# Patient Record
Sex: FEMALE | Race: WHITE
[De-identification: names, ages, dates, MRNs, and addresses within clinical notes are randomized per-mention and may not be internally consistent; named-entity substitution may affect disease eponyms.]

---

## 2018-05-22 ENCOUNTER — HOSPITAL ENCOUNTER (EMERGENCY)
Dept: HOSPITAL 97 - ER | Age: 33
Discharge: ANOTHER HOSPITAL | End: 2018-05-22
Payer: SELF-PAY

## 2018-05-22 DIAGNOSIS — R45.851: Primary | ICD-10-CM

## 2018-05-22 DIAGNOSIS — N39.0: ICD-10-CM

## 2018-05-22 LAB
ALBUMIN SERPL BCP-MCNC: 4.1 G/DL (ref 3.2–5.5)
ALCOHOL SERUM/PLASMA: < 10 MG/DL
ALP SERPL-CCNC: 46 IU/L (ref 42–121)
ALT SERPL W P-5'-P-CCNC: 12 IU/L (ref 10–60)
AST SERPL W P-5'-P-CCNC: 15 IU/L (ref 10–42)
BUN BLD-MCNC: 11 MG/DL (ref 6–20)
GLUCOSE SERPLBLD-MCNC: 97 MG/DL (ref 65–120)
HCT VFR BLD CALC: 39.2 % (ref 36–45)
INR BLD: 1.03
LYMPHOCYTES # SPEC AUTO: 2.3 K/UL (ref 0.7–4.9)
MCH RBC QN AUTO: 28.8 PG (ref 27–35)
MCV RBC: 88.9 FL (ref 80–100)
METHAMPHET UR QL SCN: POSITIVE
PMV BLD: 10.9 FL (ref 7.6–11.3)
POTASSIUM SERPL-SCNC: 4.1 MEQ/L (ref 3.6–5)
RBC # BLD: 4.41 M/UL (ref 3.86–4.86)
THC SERPL-MCNC: NEGATIVE NG/ML

## 2018-05-22 PROCEDURE — 80048 BASIC METABOLIC PNL TOTAL CA: CPT

## 2018-05-22 PROCEDURE — 81003 URINALYSIS AUTO W/O SCOPE: CPT

## 2018-05-22 PROCEDURE — 80307 DRUG TEST PRSMV CHEM ANLYZR: CPT

## 2018-05-22 PROCEDURE — 80320 DRUG SCREEN QUANTALCOHOLS: CPT

## 2018-05-22 PROCEDURE — 99285 EMERGENCY DEPT VISIT HI MDM: CPT

## 2018-05-22 PROCEDURE — 85730 THROMBOPLASTIN TIME PARTIAL: CPT

## 2018-05-22 PROCEDURE — 85025 COMPLETE CBC W/AUTO DIFF WBC: CPT

## 2018-05-22 PROCEDURE — 81025 URINE PREGNANCY TEST: CPT

## 2018-05-22 PROCEDURE — 80329 ANALGESICS NON-OPIOID 1 OR 2: CPT

## 2018-05-22 PROCEDURE — 80076 HEPATIC FUNCTION PANEL: CPT

## 2018-05-22 PROCEDURE — 36415 COLL VENOUS BLD VENIPUNCTURE: CPT

## 2018-05-22 PROCEDURE — 93005 ELECTROCARDIOGRAM TRACING: CPT

## 2018-05-22 PROCEDURE — 85610 PROTHROMBIN TIME: CPT

## 2018-05-22 NOTE — EDPHYS
Physician Documentation                                                                           

 [facility]                                                                

Name: Pilar Haro                                                                                  

Age: 33 yrs                                                                                       

Sex: Female                                                                                       

: 1985                                                                                   

MRN: [id number]                                                                                   

Arrival Date: 2018                                                                          

Time: 14:12                                                                                       

Account#: [id number]                                                                            

Bed 17                                                                                            

Private MD:                                                                                       

ED Physician Caleb Figueroa                                                                         

HPI:                                                                                              

                                                                                             

14:46 This 33 yrs old  Female presents to ER via EMS with complaints of Suicidal     kb  

      Ideation.                                                                                   

14:46 The patient presents to the emergency department with suicide ideation, but the patient kb  

      has no formulated plan. Onset: The symptoms/episode began/occurred a few months ago.        

      Associated signs and symptoms: Pertinent positives; suicide ideation, Pertinent             

      negatives: abdominal pain, anxiety, chest pain, chills, delusions, depression, fever,       

      hallucinations, headache, homicidal ideation, nausea, night sweats, palpitations,           

      paranoia, shortness of breath, substance abuse, tremor, vomiting. Severity of symptoms:     

      At their worst the symptoms were moderate in the emergency department the symptoms are      

      unchanged. The patient has not experienced similar symptoms in the past. The patient        

      has not recently seen a physician.                                                          

14:49 Pt states she has been having suicidal thoughts for the last few months. States her     kb  

      kids were taken by CPS, she became homeless and her car just repossessed. Does not have     

      a plan. Has never had these feelings before. .                                              

                                                                                                  

OB/GYN:                                                                                           

14:18 LMP 2018                                                                           em  

                                                                                                  

Historical:                                                                                       

- Allergies:                                                                                      

14:18 No Known Allergies;                                                                     em  

- Home Meds:                                                                                      

14:18 None [Active];                                                                          em  

- PMHx:                                                                                           

14:18 None;                                                                                   em  

- PSHx:                                                                                           

14:18 Tubal ligation;                                                                         em  

                                                                                                  

- Immunization history:: Adult Immunizations not up to date.                                      

- Social history:: Smoking status: Patient/guardian denies using tobacco.                         

- Ebola Screening: : No symptoms or risks identified at this time.                                

                                                                                                  

                                                                                                  

ROS:                                                                                              

14:45 Constitutional: Negative for fever, chills, and weight loss, ENT: Negative for injury,  kb  

      pain, and discharge, Neck: Negative for injury, pain, and swelling, Cardiovascular:         

      Negative for chest pain, palpitations, and edema, Respiratory: Negative for shortness       

      of breath, cough, wheezing, and pleuritic chest pain, Abdomen/GI: Negative for              

      abdominal pain, nausea, vomiting, diarrhea, and constipation, Back: Negative for injury     

      and pain, : Negative for injury, bleeding, discharge, and swelling, MS/Extremity:         

      Negative for injury and deformity, Skin: Negative for injury, rash, and discoloration,      

      Neuro: Negative for headache, weakness, numbness, tingling, and seizure.                    

14:45 Psych: Positive for suicidal ideation.                                                      

                                                                                                  

Exam:                                                                                             

14:45 Constitutional:  This is a well developed, well nourished patient who is awake, alert,  kb  

      and in no acute distress. Head/Face:  Normocephalic, atraumatic. ENT:  Nares patent. No     

      nasal discharge, no septal abnormalities noted.  Tympanic membranes are normal and          

      external auditory canals are clear.  Oropharynx with no redness, swelling, or masses,       

      exudates, or evidence of obstruction, uvula midline.  Mucous membranes moist. Neck:         

      Trachea midline, no thyromegaly or masses palpated, and no cervical lymphadenopathy.        

      Supple, full range of motion without nuchal rigidity, or vertebral point tenderness.        

      No Meningismus. Chest/axilla:  Normal chest wall appearance and motion.  Nontender with     

      no deformity.  No lesions are appreciated. Cardiovascular:  Regular rate and rhythm         

      with a normal S1 and S2.  No gallops, murmurs, or rubs.  Normal PMI, no JVD.  No pulse      

      deficits. Respiratory:  Lungs have equal breath sounds bilaterally, clear to                

      auscultation and percussion.  No rales, rhonchi or wheezes noted.  No increased work of     

      breathing, no retractions or nasal flaring. Abdomen/GI:  Soft, non-tender, with normal      

      bowel sounds.  No distension or tympany.  No guarding or rebound.  No evidence of           

      tenderness throughout. Skin:  Warm, dry with normal turgor.  Normal color with no           

      rashes, no lesions, and no evidence of cellulitis. MS/ Extremity:  Pulses equal, no         

      cyanosis.  Neurovascular intact.  Full, normal range of motion. Neuro:  Awake and           

      alert, GCS 15, oriented to person, place, time, and situation.  Cranial nerves II-XII       

      grossly intact.  Motor strength 5/5 in all extremities.  Sensory grossly intact.            

      Cerebellar exam normal.  Normal gait.                                                       

14:45 Psych: Behavior/mood is suicidal, Affect is calm, Oriented to person, place, time,          

      Patient having thoughts of suicide. Denies suicidal plan. Judgement / Insight is            

      normal. Memory is normal. Delusions/hallucinations are not present.                         

                                                                                                  

Vital Signs:                                                                                      

14:18  / 99; Pulse 82; Resp 18; Temp 98.3(O); Pulse Ox 100% on R/A; Weight 36.29 kg     em  

      (R); Height 5 ft. 1 in. (154.94 cm); Pain 0/10;                                             

17:53  / 83; Pulse 88; Resp 16; Temp 97.8(O); Pulse Ox 100% on R/A;                     mh5 

14:18 Body Mass Index 15.12 (36.29 kg, 154.94 cm)                                             em  

                                                                                                  

MDM:                                                                                              

14:13 Patient medically screened.                                                             kb  

14:46 Data reviewed: vital signs, nurses notes. Data interpreted: Pulse oximetry: on room air kb  

      is 100 %. Interpretation: normal.                                                           

17:54 ED course: Dr Fofana accepts pt for transfer to [facility].                           kb  

                                                                                                  

                                                                                             

14:13 Order name: Acetaminophen; Complete Time: 16:00                                         kb  

                                                                                             

14:13 Order name: Basic Metabolic Panel; Complete Time: 16:00                                 kb  

                                                                                             

14:13 Order name: CBC with Diff; Complete Time: 15:10                                         kb  

                                                                                             

14:13 Order name: ETOH Level; Complete Time: 16:00                                            kb  

                                                                                             

14:13 Order name: Hepatic Function; Complete Time: 16:00                                      kb  

                                                                                             

14:13 Order name: PT-INR; Complete Time: 15:21                                                kb  

                                                                                             

14:13 Order name: Ptt, Activated; Complete Time: 15:21                                        kb  

                                                                                             

14:13 Order name: Urine Drug Screen; Complete Time: 15:08                                     kb  

                                                                                             

14:13 Order name: EKG; Complete Time: 14:13                                                   kb  

                                                                                             

14:28 Order name: Urine Dipstick--Ancillary (enter results); Complete Time: 14:59             bd  

                                                                                             

14:28 Order name: Urine Pregnancy--Ancillary (enter results); Complete Time: 14:59            bd  

                                                                                             

14:55 Order name: Salicylates Level; Complete Time: 15:55                                     EDMS

                                                                                             

14:13 Order name: Urine Pregnancy Test (obtain specimen); Complete Time: 14:39                kb  

                                                                                             

14:13 Order name: EKG - Nurse/Tech; Complete Time: 14:39                                      kb  

                                                                                             

14:13 Order name: IV Saline Lock; Complete Time: 15:12                                        kb  

                                                                                             

14:13 Order name: Labs collected and sent; Complete Time: 15:12                               kb  

                                                                                             

14:13 Order name: Urine Dipstick-Ancillary (obtain specimen); Complete Time: 14:39            kb  

                                                                                             

15:25 Order name: Diet Regular; Complete Time: 15:25                                          [facility] 

                                                                                                  

Administered Medications:                                                                         

18:00 Drug: Macrobid 100 mg Route: PO;                                                          

                                                                                                  

                                                                                                  

Disposition:                                                                                      

17:52 Co-signature as Attending Physician, Caleb Figueroa MD.                                    rn  

                                                                                                  

Disposition:                                                                                      

18 17:53 Transfer ordered to [facility]. Diagnosis are Suicidal         

  ideations, Urinary tract infection, site not specified.                                         

- Reason for transfer: Higher level of care.                                                      

- Accepting physician is Dr. Fofana.                                                           

- Condition is Stable.                                                                            

- Problem is new.                                                                                 

- Symptoms are unchanged.                                                                         

                                                                                                  

                                                                                                  

                                                                                                  

Signatures:                                                                                       

Dispatcher [employer]                           [facility]                                                 

Audelia Brito, FNP-C                 FNP-Ckb                                                   

Ruiz Muro, RN                         RN   sg                                                   

Luis Miguel, Sandip, LVN                       LVN  em                                                   

Caleb Figueroa MD MD   rn                                                   

                                                                                                  

Corrections: (The following items were deleted from the chart)                                    

14:51 14:49 Pt states she has been having suicidal thoughts for the last few months. States   kb  

      her kids were taken by CPS, she became homeless and her car just repossessed.. kb           

14:55 14:13 SALICYLATE+C.LAB.BRZ ordered. [facility]                                                EDMS

17:55 17:53 2018 17:53 Transfer ordered to [facility]. Diagnosis is kb  

      Suicidal ideations; Urinary tract infection, site not specified. Reason for transfer:       

      Higher level of care. Accepting physician is . Condition is Stable. Problem is new.      

      Symptoms are unchanged. rn                                                                  

18:59 17:55 2018 17:53 Transfer ordered to [facility]. Diagnosis is sg  

      Suicidal ideations; Urinary tract infection, site not specified. Reason for transfer:       

      Higher level of care. Accepting physician is Dr. Fofana. Condition is Stable.            

      Problem is new. Symptoms are unchanged. kb                                                  

                                                                                                  

**************************************************************************************************

## 2018-05-22 NOTE — ER
Nurse's Notes                                                                                     

 [facility]                                                                

Name: Pilar Haro                                                                                  

Age: 33 yrs                                                                                       

Sex: Female                                                                                       

: 1985                                                                                   

MRN: [id number]                                                                                   

Arrival Date: 2018                                                                          

Time: 14:12                                                                                       

Account#: [id number]                                                                            

Bed 17                                                                                            

Private MD:                                                                                       

Diagnosis: Suicidal ideations;Urinary tract infection, site not specified                         

                                                                                                  

Presentation:                                                                                     

                                                                                             

14:13 Presenting complaint: EMS states: called out for someone who was SI, does not have a    em  

      plan, denies HI, states, "doesn't want to live anymore because CPS took my kids in          

      August due to drugs." reports hx. of meth. use. mental health deputy notified PTA.          

      Transition of care: patient was not received from another setting of care. Onset of         

      symptoms was May 22, 2018. Risk Assessment: Do you want to hurt yourself or someone         

      else? Patient reports desire/thoughts of hurting themselves or someone else. Provider       

      notified. Initial Sepsis Screen: Does the patient have a suspected source of                

      infection?. Care prior to arrival: None.                                                    

14:13 Method Of Arrival: EMS: [facility]                                                     em  

14:13 Acuity: TIFFANIE 2                                                                           iw  

16:45 Initial Sepsis Screen: Does the patient meet any 2 criteria? No. Patient's initial      sg  

      sepsis screen is negative. Does the patient have a suspected source of infection? No.       

      Patient's initial sepsis screen is negative.                                                

                                                                                                  

OB/GYN:                                                                                           

14:18 LMP 2018                                                                           em  

                                                                                                  

Historical:                                                                                       

- Allergies:                                                                                      

14:18 No Known Allergies;                                                                     em  

- Home Meds:                                                                                      

14:18 None [Active];                                                                          em  

- PMHx:                                                                                           

14:18 None;                                                                                   em  

- PSHx:                                                                                           

14:18 Tubal ligation;                                                                         em  

                                                                                                  

- Immunization history:: Adult Immunizations not up to date.                                      

- Social history:: Smoking status: Patient/guardian denies using tobacco.                         

- Ebola Screening: : No symptoms or risks identified at this time.                                

                                                                                                  

                                                                                                  

Screenin:24 Abuse screen: Denies threats or abuse. Nutritional screening: No deficits noted.        em  

      Tuberculosis screening: No symptoms or risk factors identified. Fall Risk None              

      identified.                                                                                 

                                                                                                  

Assessment:                                                                                       

14:21 General: Appears in no apparent distress. slender, Behavior is calm, cooperative. Pain: em  

      Denies pain. Neuro: Level of Consciousness is awake, alert, obeys commands, Oriented to     

      person, place, time, situation. Cardiovascular: Capillary refill < 3 seconds Patient's      

      skin is warm and dry. Respiratory: Airway is patent Respiratory effort is even,             

      unlabored, Respiratory pattern is regular, symmetrical. GI: Abdomen is flat. : No         

      signs and/or symptoms were reported regarding the genitourinary system. EENT: No signs      

      and/or symptoms were reported regarding the EENT system. Derm: Skin is intact.              

      Musculoskeletal: Range of motion: intact in all extremities.                                

15:00 Reassessment: Patient appears in no apparent distress at this time. Patient and/or      em  

      family updated on plan of care and expected duration. Pain level reassessed. Patient is     

      alert, oriented x 3, equal unlabored respirations, skin warm/dry/pink. resting              

      comfortably in bed.                                                                         

15:56 Reassessment: Patient appears in no apparent distress at this time. Patient and/or      em  

      family updated on plan of care and expected duration. Pain level reassessed. Patient is     

      alert, oriented x 3, equal unlabored respirations, skin warm/dry/pink. request              

      something to drink, soda given to pt.                                                       

16:51 Reassessment: [facility] evaluator at bedside at this time with pt.                      sg  

17:30 Reassessment: Patient appears in no apparent distress at this time. Patient and/or      sg  

      family updated on plan of care and expected duration. Pain level reassessed. Patient is     

      alert, oriented x 3, equal unlabored respirations, skin warm/dry/pink. pt eating meal       

      tray at this time, independent, tolerated well, denies N/V, reports SI remains, denies      

      HI at this time, pt denies having a plan, pt reports she would like to go to a facility     

      for treatment, updated on process of transfer, pt stated understanding, awaiting            

      acceptance at this time, no new orders received, will continue to monitor Patient           

      states symptoms have not improved.                                                          

18:15 Reassessment: Patient appears in no apparent distress at this time. Patient and/or      sg  

      family updated on plan of care and expected duration. Pain level reassessed. Patient is     

      alert, oriented x 3, equal unlabored respirations, skin warm/dry/pink. [facility]            

      contacted for pt report at this time, awaiting pt report to be given, awaiting              

      transport to receiving facility, will continue to monitor Patient states symptoms have      

      not improved.                                                                               

18:20 Reassessment: Patient appears in no apparent distress at this time. Patient and/or      sg  

      family updated on plan of care and expected duration. Pain level reassessed. Patient is     

      alert, oriented x 3, equal unlabored respirations, skin warm/dry/pink. pt informed of       

      acceptance at [facility], report has been called, awaiting transport to receiving            

      facility at this time, pt stated understanding, IV access remains in place until            

      transport team has arrived, IV access will be removed, will continue to monitor Patient     

      states symptoms have not improved.                                                          

                                                                                                  

Psych:                                                                                            

14:31 Subjective: Patient's mood is sad. Subjective: Delusions are denied, Hallucinations are em  

      denied Having thoughts of suicide. Denies suicidal plan. Objective: Patient is              

      cooperative, Speech is normal, Affect is appropriate. Interventions: Removed personal       

      items and placed in bag. Patient placed in hospital gown. Searched person for dangerous     

      items. Urine collected and sent for urine drug test. Suicide Risk Assessment: Sad           

      Person Scale: Sex of patient: Female: Score 0 points. Age of patient: Score 1 point if      

      patient 15-34. Depression: Score 1 point if signs of depression are present. Previous       

      Attempt: Score 0 point if patient has not previously attempted suicide. Substance           

      Abuse: Score 1 point if patient abuses alcohol or drugs. Rational Thinking: Score 0         

      point if patient has rational thinking. Social Support: Score 1 point if social support     

      is lacking and/or unavailable. Organized Plan: Score 0 if patient did not have an           

      organized plan in place. Relationship: TOTAL POINTS: If total points are 3-4, proposed      

      clinical action is close follow-up/consider hospitalization. Safety Checks: Personal        

      items have been removed. Door is open. No visitors are present at this time. Patient        

      uses cocaine, Last use was 1 weeks ago. Patient uses marijuana Last use was 1 weeks         

      ago. Patient uses methamphetamines Last use was 1 weeks ago. Commitment: Patient will       

      be a voluntary commitment.                                                                  

14:45 Safety Checks: Personal items have been removed. Door is open. No visitors are present  em  

      at this time.                                                                               

15:00 Safety Checks: Personal items have been removed. Door is open. No visitors are present  em  

      at this time.                                                                               

                                                                                                  

Vital Signs:                                                                                      

14:18  / 99; Pulse 82; Resp 18; Temp 98.3(O); Pulse Ox 100% on R/A; Weight 36.29 kg     em  

      (R); Height 5 ft. 1 in. (154.94 cm); Pain 0/10;                                             

17:53  / 83; Pulse 88; Resp 16; Temp 97.8(O); Pulse Ox 100% on R/A;                     mh5 

14:18 Body Mass Index 15.12 (36.29 kg, 154.94 cm)                                             em  

                                                                                                  

ED Course:                                                                                        

14:12 Patient arrived in ED.                                                                  em  

14:13 [facility]                                                        kb  

14:13 Caleb Figueroa MD is Attending Physician.                                                kb  

14:19 Sandip Bolanos LVN is Primary Nurse.                                                     em  

14:20 Patient has correct armband on for positive identification. Placed in gown. Bed in low  em  

      position. Call light in reach.                                                              

14:23 Arm band placed on.                                                                     em  

14:35 Safety checks: Items removed: yes. Door open/sign placed on door: yes. Family/friend    mh5 

      present: no.                                                                                

14:35 No provider procedures requiring assistance completed.                                  em  

14:38 Triage completed.                                                                       iw  

15:05 Safety checks: Items removed: yes. Door open/sign placed on door: yes. Family/friend    mh5 

      present: no.                                                                                

15:06 EKG done, by EKG tech. reviewed by Caleb Figueroa MD.                                      3 

15:20 Safety checks: Items removed: yes. Door open/sign placed on door: yes. Family/friend    mh5 

      present: no.                                                                                

15:35 Safety checks: Items removed: yes. Door open/sign placed on door: yes. Family/friend    mh5 

      present: no.                                                                                

15:37 Initial lab(s) drawn, by me, sent to lab. Urine collected: clean catch specimen,        mh5 

      cloudy. Inserted saline lock: 22 gauge in right antecubital area, using aseptic             

      technique. Blood collected.                                                                 

15:50 Safety checks: Items removed: yes. Door open/sign placed on door: yes. Family/friend    mh5 

      present: no.                                                                                

16:05 notified [facility] to have a screener come evaluate pt.                                bd  

16:05 Safety checks: Items removed: yes. Door open/sign placed on door: yes. Family/friend    mh5 

      present: no.                                                                                

16:20 Safety checks: Items removed: yes. Door open/sign placed on door: yes. Family/friend    mh5 

      present: no.                                                                                

16:35 Safety checks: Items removed: yes. Door open/sign placed on door: yes. Family/friend    mh5 

      present: no.                                                                                

16:50 Safety checks: Items removed: yes. Door open/sign placed on door: yes. Family/friend    mh5 

      present: Other: [facility] screening pt.                                                    

17:05 Safety checks: Items removed: yes. Door open/sign placed on door: yes. Family/friend    mh5 

      present: no.                                                                                

17:20 Safety checks: Items removed: yes. Door open/sign placed on door: yes. Family/friend    mh5 

      present: no.                                                                                

17:35 Safety checks: Items removed: yes. Door open/sign placed on door: yes. Family/friend    mh5 

      present: no.                                                                                

17:50 Safety checks: Items removed: yes. Door open/sign placed on door: yes. Family/friend    mh5 

      present: no.                                                                                

18:05 Safety checks: Items removed: yes. Door open/sign placed on door: yes. Family/friend    mh5 

      present: no.                                                                                

18:20 Safety checks: Items removed: yes. Door open/sign placed on door: yes. Family/friend    mh5 

      present: no.                                                                                

18:29 IV discontinued, intact, bleeding controlled, No redness/swelling at site. Pressure     sg  

      dressing applied.                                                                           

18:35 Safety checks: Items removed: yes. Door open/sign placed on door: yes. Family/friend    mh5 

      present: yes.                                                                               

18:50 Safety checks: Items removed: yes. Door open/sign placed on door: yes. Family/friend    mh5 

      present: no.                                                                                

                                                                                                  

Administered Medications:                                                                         

18:00 Drug: Macrobid 100 mg Route: PO;                                                        sg  

                                                                                                  

                                                                                                  

Outcome:                                                                                          

17:53 ER care complete, transfer ordered by MD.                                               rn  

18:29 Transferred by ground EMS to [facility], Transfer form completed. Note:     sg  

      report called to Grace Yi RN for [facility]                                              

18:29 Condition: stable                                                                           

18:29 Instructed on the need for admit, safety practices, Demonstrated understanding of           

      instructions.                                                                               

18:50 Condition: report given to [facility] for transport to receiving facility                  

18:59 Patient left the ED.                                                                    sg  

                                                                                                  

Signatures:                                                                                       

Audelia Brito, CASTRO-C                 ONEIDAP-Judy Acevedo Steven RN                         RN   sg                                                   

Sandip Bolanos, LVN                       LVN  Ammy Gregory RN RN                                                      

Caleb Figueroa MD MD rn Martinez, Maria                              [facility]                                                  

Chari Pond                              [facility]                                                  

                                                                                                  

Corrections: (The following items were deleted from the chart)                                    

14:36 14:13 Presenting complaint: EMS states: called out for someone who was SI, does not     em  

      have a plan, denies HI, states, "doesn't want to live anymore because CPS took my kids      

      in August due to drugs." reports hx. of meth. use em                                        

                                                                                                  

**************************************************************************************************

## 2018-05-22 NOTE — EKG
Test Date:    2018-05-22               Test Time:    14:28:37

Technician:   JULIA                                     

                                                     

MEASUREMENT RESULTS:                                       

Intervals:                                           

Rate:         70                                     

MO:           128                                    

QRSD:         84                                     

QT:           400                                    

QTc:          432                                    

Axis:                                                

P:            67                                     

MO:           128                                    

QRS:          66                                     

T:            58                                     

                                                     

INTERPRETIVE STATEMENTS:                                       

                                                     

Normal sinus rhythm with sinus arrhythmia

Normal ECG

No previous ECG available for comparison



Electronically Signed On 05-22-18 18:33:25 CDT by Jesús Lee